# Patient Record
Sex: MALE | Race: WHITE | NOT HISPANIC OR LATINO | Employment: OTHER | URBAN - METROPOLITAN AREA
[De-identification: names, ages, dates, MRNs, and addresses within clinical notes are randomized per-mention and may not be internally consistent; named-entity substitution may affect disease eponyms.]

---

## 2020-11-12 ENCOUNTER — ANCILLARY PROCEDURE (OUTPATIENT)
Dept: CARDIOLOGY | Facility: CLINIC | Age: 75
End: 2020-11-12
Attending: INTERNAL MEDICINE
Payer: COMMERCIAL

## 2020-11-12 ENCOUNTER — HOSPITAL ENCOUNTER (OUTPATIENT)
Dept: GENERAL RADIOLOGY | Facility: CLINIC | Age: 75
End: 2020-11-12
Attending: INTERNAL MEDICINE
Payer: COMMERCIAL

## 2020-11-12 ENCOUNTER — HOSPITAL ENCOUNTER (OUTPATIENT)
Dept: MRI IMAGING | Facility: CLINIC | Age: 75
End: 2020-11-12
Attending: INTERNAL MEDICINE
Payer: COMMERCIAL

## 2020-11-12 DIAGNOSIS — Z95.0 PACEMAKER: ICD-10-CM

## 2020-11-12 DIAGNOSIS — M79.601 PAIN IN RIGHT ARM: ICD-10-CM

## 2020-11-12 DIAGNOSIS — T15.90XA FOREIGN BODY IN EYE, UNSPECIFIED LATERALITY, INITIAL ENCOUNTER: ICD-10-CM

## 2020-11-12 LAB — RADIOLOGIST FLAGS: ABNORMAL

## 2020-11-12 PROCEDURE — 70200 X-RAY EXAM OF EYE SOCKETS: CPT

## 2020-11-12 PROCEDURE — 72141 MRI NECK SPINE W/O DYE: CPT | Mod: 26 | Performed by: STUDENT IN AN ORGANIZED HEALTH CARE EDUCATION/TRAINING PROGRAM

## 2020-11-12 PROCEDURE — 71046 X-RAY EXAM CHEST 2 VIEWS: CPT | Mod: 26 | Performed by: RADIOLOGY

## 2020-11-12 PROCEDURE — 93286 PERI-PX EVAL PM/LDLS PM IP: CPT

## 2020-11-12 PROCEDURE — 70200 X-RAY EXAM OF EYE SOCKETS: CPT | Mod: 26 | Performed by: STUDENT IN AN ORGANIZED HEALTH CARE EDUCATION/TRAINING PROGRAM

## 2020-11-12 PROCEDURE — 71046 X-RAY EXAM CHEST 2 VIEWS: CPT

## 2020-11-12 PROCEDURE — 93286 PERI-PX EVAL PM/LDLS PM IP: CPT | Mod: 26 | Performed by: INTERNAL MEDICINE

## 2020-11-12 PROCEDURE — 72141 MRI NECK SPINE W/O DYE: CPT

## 2020-12-16 LAB
MDC_IDC_LEAD_IMPLANT_DT: NORMAL
MDC_IDC_LEAD_LOCATION: NORMAL
MDC_IDC_LEAD_LOCATION_DETAIL_1: NORMAL
MDC_IDC_LEAD_MFG: NORMAL
MDC_IDC_LEAD_MODEL: NORMAL
MDC_IDC_LEAD_POLARITY_TYPE: NORMAL
MDC_IDC_LEAD_SERIAL: NORMAL
MDC_IDC_LEAD_SPECIAL_FUNCTION: NORMAL
MDC_IDC_MSMT_BATTERY_REMAINING_LONGEVITY: 93 MO
MDC_IDC_MSMT_BATTERY_REMAINING_LONGEVITY: 93 MO
MDC_IDC_MSMT_BATTERY_STATUS: NORMAL
MDC_IDC_MSMT_BATTERY_STATUS: NORMAL
MDC_IDC_MSMT_BATTERY_VOLTAGE: 2.98 V
MDC_IDC_MSMT_BATTERY_VOLTAGE: 2.98 V
MDC_IDC_MSMT_LEADCHNL_RA_IMPEDANCE_VALUE: 440 OHM
MDC_IDC_MSMT_LEADCHNL_RA_IMPEDANCE_VALUE: 460 OHM
MDC_IDC_MSMT_LEADCHNL_RA_PACING_THRESHOLD_AMPLITUDE: 0.75 V
MDC_IDC_MSMT_LEADCHNL_RA_PACING_THRESHOLD_PULSEWIDTH: 0.5 MS
MDC_IDC_MSMT_LEADCHNL_RA_SENSING_INTR_AMPL: 5 MV
MDC_IDC_MSMT_LEADCHNL_RA_SENSING_INTR_AMPL: 5 MV
MDC_IDC_MSMT_LEADCHNL_RV_IMPEDANCE_VALUE: 440 OHM
MDC_IDC_MSMT_LEADCHNL_RV_IMPEDANCE_VALUE: 450 OHM
MDC_IDC_MSMT_LEADCHNL_RV_PACING_THRESHOLD_AMPLITUDE: 0.5 V
MDC_IDC_MSMT_LEADCHNL_RV_PACING_THRESHOLD_PULSEWIDTH: 0.5 MS
MDC_IDC_MSMT_LEADCHNL_RV_SENSING_INTR_AMPL: 12 MV
MDC_IDC_PG_IMPLANT_DTM: NORMAL
MDC_IDC_PG_IMPLANT_DTM: NORMAL
MDC_IDC_PG_MFG: NORMAL
MDC_IDC_PG_MFG: NORMAL
MDC_IDC_PG_MODEL: NORMAL
MDC_IDC_PG_MODEL: NORMAL
MDC_IDC_PG_SERIAL: NORMAL
MDC_IDC_PG_SERIAL: NORMAL
MDC_IDC_PG_TYPE: NORMAL
MDC_IDC_PG_TYPE: NORMAL
MDC_IDC_SESS_CLINIC_NAME: NORMAL
MDC_IDC_SESS_CLINIC_NAME: NORMAL
MDC_IDC_SESS_DTM: NORMAL
MDC_IDC_SESS_DTM: NORMAL
MDC_IDC_SESS_TYPE: NORMAL
MDC_IDC_SESS_TYPE: NORMAL
MDC_IDC_SET_BRADY_AT_MODE_SWITCH_MODE: NORMAL
MDC_IDC_SET_BRADY_AT_MODE_SWITCH_MODE: NORMAL
MDC_IDC_SET_BRADY_AT_MODE_SWITCH_RATE: 180 {BEATS}/MIN
MDC_IDC_SET_BRADY_AT_MODE_SWITCH_RATE: 180 {BEATS}/MIN
MDC_IDC_SET_BRADY_HYSTRATE: NORMAL
MDC_IDC_SET_BRADY_HYSTRATE: NORMAL
MDC_IDC_SET_BRADY_LOWRATE: 60 {BEATS}/MIN
MDC_IDC_SET_BRADY_LOWRATE: 60 {BEATS}/MIN
MDC_IDC_SET_BRADY_MAX_SENSOR_RATE: 130 {BEATS}/MIN
MDC_IDC_SET_BRADY_MAX_SENSOR_RATE: 130 {BEATS}/MIN
MDC_IDC_SET_BRADY_MAX_TRACKING_RATE: 130 {BEATS}/MIN
MDC_IDC_SET_BRADY_MAX_TRACKING_RATE: 130 {BEATS}/MIN
MDC_IDC_SET_BRADY_MODE: NORMAL
MDC_IDC_SET_BRADY_MODE: NORMAL
MDC_IDC_SET_BRADY_NIGHT_RATE: NORMAL
MDC_IDC_SET_BRADY_NIGHT_RATE: NORMAL
MDC_IDC_SET_BRADY_PAV_DELAY_LOW: 200 MS
MDC_IDC_SET_BRADY_PAV_DELAY_LOW: 200 MS
MDC_IDC_SET_BRADY_SAV_DELAY_LOW: 180 MS
MDC_IDC_SET_BRADY_SAV_DELAY_LOW: 180 MS
MDC_IDC_SET_LEADCHNL_RA_PACING_AMPLITUDE: 1.5 V
MDC_IDC_SET_LEADCHNL_RA_PACING_AMPLITUDE: 1.5 V
MDC_IDC_SET_LEADCHNL_RA_PACING_CAPTURE_MODE: NORMAL
MDC_IDC_SET_LEADCHNL_RA_PACING_CAPTURE_MODE: NORMAL
MDC_IDC_SET_LEADCHNL_RA_PACING_POLARITY: NORMAL
MDC_IDC_SET_LEADCHNL_RA_PACING_POLARITY: NORMAL
MDC_IDC_SET_LEADCHNL_RA_PACING_PULSEWIDTH: 0.5 MS
MDC_IDC_SET_LEADCHNL_RA_PACING_PULSEWIDTH: 0.5 MS
MDC_IDC_SET_LEADCHNL_RA_SENSING_ADAPTATION_MODE: NORMAL
MDC_IDC_SET_LEADCHNL_RA_SENSING_ADAPTATION_MODE: NORMAL
MDC_IDC_SET_LEADCHNL_RA_SENSING_POLARITY: NORMAL
MDC_IDC_SET_LEADCHNL_RA_SENSING_POLARITY: NORMAL
MDC_IDC_SET_LEADCHNL_RV_PACING_AMPLITUDE: 0.75 V
MDC_IDC_SET_LEADCHNL_RV_PACING_AMPLITUDE: 0.75 V
MDC_IDC_SET_LEADCHNL_RV_PACING_CAPTURE_MODE: NORMAL
MDC_IDC_SET_LEADCHNL_RV_PACING_CAPTURE_MODE: NORMAL
MDC_IDC_SET_LEADCHNL_RV_PACING_POLARITY: NORMAL
MDC_IDC_SET_LEADCHNL_RV_PACING_POLARITY: NORMAL
MDC_IDC_SET_LEADCHNL_RV_PACING_PULSEWIDTH: 0.5 MS
MDC_IDC_SET_LEADCHNL_RV_PACING_PULSEWIDTH: 0.5 MS
MDC_IDC_SET_LEADCHNL_RV_SENSING_POLARITY: NORMAL
MDC_IDC_SET_LEADCHNL_RV_SENSING_POLARITY: NORMAL
MDC_IDC_SET_LEADCHNL_RV_SENSING_SENSITIVITY: 2 MV
MDC_IDC_SET_LEADCHNL_RV_SENSING_SENSITIVITY: 2 MV
MDC_IDC_STAT_AT_BURDEN_PERCENT: 0 %
MDC_IDC_STAT_AT_BURDEN_PERCENT: 0 %
MDC_IDC_STAT_AT_MODE_SW_COUNT: 0
MDC_IDC_STAT_AT_MODE_SW_COUNT: 0
MDC_IDC_STAT_BRADY_RA_PERCENT_PACED: 66 %
MDC_IDC_STAT_BRADY_RA_PERCENT_PACED: 66 %
MDC_IDC_STAT_BRADY_RV_PERCENT_PACED: 91 %
MDC_IDC_STAT_BRADY_RV_PERCENT_PACED: 91 %
MDC_IDC_STAT_EPISODE_RECENT_COUNT: 0
MDC_IDC_STAT_EPISODE_TYPE: NORMAL
MDC_IDC_STAT_EPISODE_VENDOR_TYPE: NORMAL

## 2023-02-28 ENCOUNTER — TELEPHONE (OUTPATIENT)
Dept: NEUROSURGERY | Facility: CLINIC | Age: 78
End: 2023-02-28
Payer: COMMERCIAL

## 2023-02-28 NOTE — TELEPHONE ENCOUNTER
LVM for patient to call clinic back if he would like to schedule an appointment [facility].   Referral is in chart under media tab.    Dx: Severe Cervical Spinal Stenosis with right sided hand numbness, pain and weakness.  Referred by: Leyda Miller

## 2023-03-06 ENCOUNTER — PRE VISIT (OUTPATIENT)
Dept: NEUROSURGERY | Facility: CLINIC | Age: 78
End: 2023-03-06
Payer: COMMERCIAL

## 2023-03-06 NOTE — TELEPHONE ENCOUNTER
NEUROSURGERY- NEW PREVISIT PLANNING       Record Status/Location     Referring Provider [facility] chart [facility]   Diagnosis  Cervical stenosis   MRI (HEAD, NECK, SPINE) In pacs Yes, w/[facility]   CT  no   X-ray  no   INJECTION  no   PHYSICAL THERAPY  no   SURGERY WAITING Yes, 1992 and 2004 w/ [facility].

## 2023-03-07 ENCOUNTER — DOCUMENTATION ONLY (OUTPATIENT)
Dept: NEUROSURGERY | Facility: CLINIC | Age: 78
End: 2023-03-07
Payer: COMMERCIAL

## 2023-03-07 ENCOUNTER — OFFICE VISIT (OUTPATIENT)
Dept: NEUROSURGERY | Facility: CLINIC | Age: 78
End: 2023-03-07
Payer: COMMERCIAL

## 2023-03-07 VITALS — OXYGEN SATURATION: 96 % | DIASTOLIC BLOOD PRESSURE: 79 MMHG | HEART RATE: 71 BPM | SYSTOLIC BLOOD PRESSURE: 166 MMHG

## 2023-03-07 DIAGNOSIS — M47.12 CERVICAL SPONDYLOSIS WITH MYELOPATHY: Primary | ICD-10-CM

## 2023-03-07 DIAGNOSIS — R55 FAINTING EPISODES: ICD-10-CM

## 2023-03-07 PROCEDURE — 99205 OFFICE O/P NEW HI 60 MIN: CPT | Performed by: NEUROLOGICAL SURGERY

## 2023-03-07 ASSESSMENT — PAIN SCALES - GENERAL: PAINLEVEL: MODERATE PAIN (5)

## 2023-03-07 NOTE — PATIENT INSTRUCTIONS
Patient Next Steps:    Order placed for cervical epidural steroid injection  The steroid can take 10-14 days to reach max effect  Please call our clinic if symptoms persist after this timeframe.  You can call [facility] to schedule your injection: [phone]    Order placed for physical therapy. You can call the phone number highlighted in the order to schedule your appointment. Please call our clinic if symptoms persist after your course of physical therapy.  If you have not heard from the scheduling office within 2 business days, please call [phone] for [facility], [phone] for [facility] and [phone] for [facility].    Order placed for EMG and NCV. You can call [phone] to schedule.     A referral has been placed for you to see neurology.     Please call us if you have any further questions or concerns.     [facility]   Phone: [phone]  Fax: [phone]
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
## 2023-03-17 ENCOUNTER — THERAPY VISIT (OUTPATIENT)
Dept: PHYSICAL THERAPY | Facility: CLINIC | Age: 78
End: 2023-03-17
Attending: NEUROLOGICAL SURGERY
Payer: COMMERCIAL

## 2023-03-17 DIAGNOSIS — M54.2 NECK PAIN: ICD-10-CM

## 2023-03-17 DIAGNOSIS — M47.12 CERVICAL SPONDYLOSIS WITH MYELOPATHY: ICD-10-CM

## 2023-03-17 PROCEDURE — 97163 PT EVAL HIGH COMPLEX 45 MIN: CPT | Mod: GP | Performed by: PHYSICAL THERAPIST

## 2023-03-17 PROCEDURE — 97110 THERAPEUTIC EXERCISES: CPT | Mod: GP | Performed by: PHYSICAL THERAPIST

## 2023-03-17 NOTE — PROGRESS NOTES
[facility]    OUTPATIENT Physical Therapy ORTHOPEDIC EVALUATION  PLAN OF TREATMENT FOR OUTPATIENT REHABILITATION  (COMPLETE FOR INITIAL CLAIMS ONLY)  Patient's Last Name, First Name, M.I.  YOB: 1945  Cj Thornton    Provider s Name:  [facility]   Medical Record No.  [id number]   Start of Care Date:  03/17/23   Onset Date:   03/07/23 (MD order)   Treatment Diagnosis:    Neck Pain  Medical Diagnosis:     Cervical spondylosis with myelopathy  Neck pain       Goals:     03/17/23 0500   Body Part   Goals listed below are for neck   Goal #1   Goal #1 sleeping   Previous Functional Level No restrictions   Current Functional Level 3-5 hours without sleep per night   STG Target Performance 2-3 hours without sleep per night   Rationale to establish restorative sleep pattern   Due Date 04/28/23   LTG Target Performance 1-2 hours without sleep per night   Rationale to establish restorative sleep pattern   Due Date 06/09/23         Therapy Frequency:  1x/week  Predicted Duration of Therapy Intervention:  12 weeks    ENDER ENCISO, PT                 I CERTIFY THE NEED FOR THESE SERVICES FURNISHED UNDER        THIS PLAN OF TREATMENT AND WHILE UNDER MY CARE     (Physician attestation of this document indicates review and certification of the therapy plan).                     Certification Date From:  03/17/23   Certification Date To:  06/09/23    Referring Provider:  Alex Chavez    Initial Assessment        See Epic Evaluation SOC Date: 03/17/23

## 2023-03-17 NOTE — PROGRESS NOTES
"Physical Therapy Initial Evaluation  Subjective:  The history is provided by the patient. No  was used.   Therapist Generated HPI Evaluation  Problem details: The patient is a 78 year old male with significant comorbidities and red flag signs (see above). The patient reports today that he is experiencing minimal neck pain at this time. He reports chronic bilateral arm weakness and numbness and tingling that has been there since his last neck surgery many years ago. Reports that he loses conciousness sometimes and is working with cardiac and getting seen at the  for this. He reports that he doesn't really know why he is here and would rather just live with the pain for the most part. Also does report some \"fatty tissue\" on the back of his neck that he would like to have removed.     Recent MRI at [facility]: significant degenerative changes, mild to moderate cord compression, hx of fusion C5-6, C4-5, C3-4  Severe spinal cancal stenosis C6-7 C5-6 moderate cord compression, dorsal cord flattening, small foci of chronic myelomalacia.   See Epic images for other recent 2020 cervical MRI.     .         Type of problem:  Cervical spine.    This is a chronic condition.  Condition occurred with:  Degenerative joint disease.    Patient reports pain:  Upper cervical spine, mid cervical spine and lower cervical spine.  Pain is described as aching, sharp and burning and is constant.  Pain radiates to:  Shoulder left, shoulder right, upper arm left, upper arm right, elbow left, elbow right, lower arm left, hand left and hand right. Pain is the same all the time.  Since onset symptoms are unchanged.  Associated symptoms:  Numbness, loss of motion/stiffness, tingling, loss of strength and fatigue. Symptoms are exacerbated by looking up or down, stress, certain positions, lying down, change of position, driving, rotating head, lifting and sitting  and relieved by nothing.  Special tests included:  MRI and " EMG.  Previous treatment includes surgery. There was none improvement following previous treatment.  Barriers include:  Lives alone.    Patient Health History  Cj Thornton being seen for neck .     Date of Onset: 1964.      Pain is reported as 7/10 on pain scale.  General health as reported by patient is fair.  Pertinent medical history includes: concussions/dizziness, depression, emphysema, heart problems, high blood pressure, implanted device, incontinence, migraines/headaches, numbness/tingling, osteoarthritis, seizures, sleep disorder/apnea and smoking.   Red flags:  Calf pain-swelling-warmth, changes in bowel and bladder habits, bilateral numbness, cold/hot extremity, chest pain, numbness in perianal region, pain at rest/night, progressive neurological deficits, severe dizziness and significant weakness.     Surgeries include:  Heart surgery and orthopedic surgery. Other surgery history details: pacemaker, multiple neck and back surgeries .    Current medications:  High blood pressure medication, pain medication and sleep medication.    Current occupation is retired .   Primary job tasks include:  Prolonged sitting and driving.                                    Objective:  Standing Alignment:    Cervical/Thoracic:  Forward head, Dowager's hump and thoracic kyphosis increased                                    Cervical/Thoracic Evaluation    AROM:  AROM Cervical:    Flexion:            Max limited P+  Extension:       Max limited P+  Rotation:         Left: max limited P+      Right: max limited P+   Side Bend:      Left: max limited P+     Right:  Max limited P+        Cervical Myotomes:        C4 (shrug):  Left: 4+    Right: 4+  C5 (Deltoid):  Left: 3+    Right: 3+  C6 (Biceps):  Left: 3+    Right: 3+  C7 (Triceps):  Left: 3+    Right: 3+  C8 (Thumb Ext): Left: 3-    Right: 3-  T1 (Intrinsics): Left: 3-    Right: 3-    Neural Tension:    Left side:  Radial; Ulnar and Median positive.  Right side:  Radial;  Ulnar and Median positive.    Cervical Palpation:    Tenderness present at Left:    Upper Trap; Levator and Erector Spinae  Tenderness present at Right:    Upper Trap; Levator and Erector Spinae                                                  General     ROS  Patient presents with significant cervical and shoulder AROM deficits as well as UE weakness and unclear if in specific myotomal patterns due to overall significant weakness.     Assessment/Plan:    Patient is a 78 year old male with cervical complaints.    Patient has the following significant findings with corresponding treatment plan.                Diagnosis 1:  Neck pain  Pain -  hot/cold therapy, US, electric stimulation, mechanical traction, manual therapy, directional preference exercise and home program  Decreased ROM/flexibility - manual therapy and therapeutic exercise  Decreased joint mobility - manual therapy and therapeutic exercise  Decreased strength - therapeutic exercise and therapeutic activities  Inflammation - cold therapy, US and electric stimulation  Impaired muscle performance - neuro re-education  Decreased function - therapeutic activities  Impaired posture - neuro re-education    Therapy Evaluation Codes:   1) History comprised of:   Personal factors that impact the plan of care:      Age, Cognition, Living environment, Overall behavior pattern, Past/current experiences, Social history/culture, Time since onset of symptoms and Work status.    Comorbidity factors that impact the plan of care are:      Chest pain, Depression, Heart problems, High blood pressure, Implanted device, Migraines/headaches, Numbness/tingling, Osteoarthritis, Pain at night/rest, Seizures, Sleep disorder/apnea, Smoking and Weakness.     Medications impacting care: High blood pressure, Pain and Sleep.  2) Examination of Body Systems comprised of:   Body structures and functions that impact the plan of care:      Cervical spine, Hand, Shoulder and Thoracic  Spine.   Activity limitations that impact the plan of care are:      Bending, Driving, Dressing, Grasping, Lifting, Reading/Computer work, Sitting, Standing, Walking and Sleeping.  3) Clinical presentation characteristics are:   Unstable/Unpredictable.  4) Decision-Making    High complexity using standardized patient assessment instrument and/or measureable assessment of functional outcome.  Cumulative Therapy Evaluation is: High complexity.    Previous and current functional limitations:  (See Goal Flow Sheet for this information)    Short term and Long term goals: (See Goal Flow Sheet for this information)     Communication ability:  Patient appears to be able to clearly communicate and understand verbal and written communication and follow directions correctly.  Treatment Explanation - The following has been discussed with the patient:   RX ordered/plan of care  Anticipated outcomes  Possible risks and side effects  This patient would benefit from PT intervention to resume normal activities.   Rehab potential is good.    Frequency:  1 X week, once daily  Duration:  for 12 weeks  Patient does report during visit today he is not sure he will come back.   Discharge Plan:  Achieve all LTG.  Independent in home treatment program.    Please refer to the daily flowsheet for treatment today, total treatment time and time spent performing 1:1 timed codes.

## 2023-05-10 PROBLEM — M54.2 NECK PAIN: Status: RESOLVED | Noted: 2023-03-17 | Resolved: 2023-05-10

## 2023-06-26 NOTE — TELEPHONE ENCOUNTER
RECORDS RECEIVED FROM: Care Everywhere   REASON FOR VISIT: Fainting episodes [R55]   Date of Appt: 8/25/23 1:45pm    NOTES (FOR ALL VISITS) STATUS DETAILS   OFFICE NOTE from referring provider Internal 3/7/23 Alex Chavez MD @[facility]   OFFICE NOTE from other specialist Care Everywhere 3/11/23 KEL CHEN @ [facility]     1/19/23 MIGUEL TANG @ [facility]     MEDICATION LIST Internal    IMAGING  (FOR ALL VISITS)     CT (HEAD, NECK, SPINE) Internal [facility]   4/11/22 CT Neck Soft Tissue     MRI (HEAD, NECK, SPINE) Internal [facility]  2/3/23 MR Cervical Spine    [facility]  11/12/20 MR Cervical Spine

## 2023-08-25 ENCOUNTER — PRE VISIT (OUTPATIENT)
Dept: NEUROLOGY | Facility: CLINIC | Age: 78
End: 2023-08-25